# Patient Record
(demographics unavailable — no encounter records)

---

## 2024-11-05 NOTE — HISTORY OF PRESENT ILLNESS
[de-identified] :  CATHERINE RANGEL is a 32 year old patient seen initially for throat pain of 18 months duration. He initially thought it would go away but it has worsened recently. 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 Unable to hit high notes and feels like muscles are constricting. He does not smoke and is not a heavy drinker. He hasn't taken any meds for the throat pain. No unexplained weight loss, hemoptysis, or hematochezia. Mild difficulty swallowing.  He had a thyroid ultrasound done on 1/2024 with a 1.5x 0.5x 0.8cm TIRADS 3 nodule on the right and a 0.4 x 0.3 x 0.3cm TIRADS 4 nodule on the left. No family history of thyroid cancer

## 2024-11-05 NOTE — ASSESSMENT
[FreeTextEntry1] : Vocal cord nodules and LPR.  I also suspect muscle tension dysphonia. Will treat with famotidine twice daily. Emphasize diet and lifestyle modifications. LPR patient education material was provided.  Touched on the possibility of potentially requiring speech therapy. Follow-up in 6 weeks.  The thyroid nodule is incidental.  I explained that is not what causing him his problem.  Would recommend repeat sonogram in 1 year. Of note no family history of thyroid cancer or significant radiation exposure. Has normal thyroid function.

## 2024-11-05 NOTE — PROCEDURE
[de-identified] : PROCEDURE: Flexible laryngoscopy SURGEON: Dr. Castrejon INDICATIONS: He was unable to tolerate a mirror exam. Assess for laryngopharynx pharyngeal reflux. cough. head and neck mass. Verbal Consent obtained. ANESTHESIA: The patient was placed in a sitting position.  Following application of the topical anesthetic and decongestant, exam was performed with a flexible scope.  The scope was passed along the right nasal floor to the nasopharynx.  It was then passed into the region of the middle meatus, middle turbinate, and sphenoethmoid region.  An identical procedure was performed on the left side.  The following findings were noted:   The nasal musoca was healthy appearing and the septum was roughly midline. The middle meatus and sphenoethmoid recesses were clear bilaterally. The nasopharynx   Nasopharynx: no masses, choanae patent, no adenoid tissue   Base of tongue/vallecula: no masses or asymmetry Pharyngeal walls: symmetrical. No masses. Pyriform sinuses: no lesions or pooling of secretions. Epiglottis: normal. No edema or lesions. Aryepiglottic folds: normal. No lesions. Vocal cords: clear and mobile. No lesions. Airway patent. Arytenoids: no edema or erythema. Interarytenoid area: no edema, erythema or lesion.   Tolerated the procedure well.   Bilateral vocal cord nodules. Bilateral mild to moderate LPR.

## 2024-12-17 NOTE — HISTORY OF PRESENT ILLNESS
[de-identified] : Follow up visit He had been taking Famotidine twice daily and made some lifestyle modifications.  He feels about 15-20% better. Still reports slight pain and tension when speaking.

## 2024-12-17 NOTE — PROCEDURE
[de-identified] : PROCEDURE: Flexible laryngoscopy SURGEON: Dr. Castrejon INDICATIONS: He was unable to tolerate a mirror exam. Assess for laryngopharynx pharyngeal reflux. cough. head and neck mass. Verbal Consent obtained. ANESTHESIA: The patient was placed in a sitting position.  Following application of the topical anesthetic and decongestant, exam was performed with a flexible scope.  The scope was passed along the right nasal floor to the nasopharynx.  It was then passed into the region of the middle meatus, middle turbinate, and sphenoethmoid region.  An identical procedure was performed on the left side.  The following findings were noted:   The nasal musoca was healthy appearing and the septum was roughly midline. The middle meatus and sphenoethmoid recesses were clear bilaterally. The nasopharynx   Nasopharynx: no masses, choanae patent, no adenoid tissue   Base of tongue/vallecula: no masses or asymmetry Pharyngeal walls: symmetrical. No masses. Pyriform sinuses: no lesions or pooling of secretions. Epiglottis: normal. No edema or lesions. Aryepiglottic folds: normal. No lesions. Vocal cords: clear and mobile. No lesions. Airway patent. Arytenoids: no edema or erythema. Interarytenoid area: no edema, erythema or lesion.   Tolerated the procedure well.   Bilateral vocal cord nodules

## 2024-12-17 NOTE — ASSESSMENT
[FreeTextEntry1] : He has pain and hoarseness.  He reports feeling 15 to 20% improved. Will recommend omeprazole in the morning and famotidine before bed. Will also rescue him with a low burst of prednisone. Will also suggest he have speech therapy for his vocal cord nodules. Follow-up in 6 to 8 weeks.

## 2025-02-10 NOTE — ASSESSMENT
[FreeTextEntry1] : Clinically doing much better. Will renew his prescriptions. Emphasized that I like him to take the PPI before breakfast and then the fold noted in before bed. He has an appointment for speech therapy in approximately 2 months for vocal cord nodule.  He will see me in follow-up in 3 months.

## 2025-02-10 NOTE — PROCEDURE
[de-identified] : PROCEDURE: Flexible laryngoscopy SURGEON: Dr. Castrejon INDICATIONS: He was unable to tolerate a mirror exam. Assess for laryngopharynx pharyngeal reflux. cough. head and neck mass. Verbal Consent obtained. ANESTHESIA: The patient was placed in a sitting position.  Following application of the topical anesthetic and decongestant, exam was performed with a flexible scope.  The scope was passed along the right nasal floor to the nasopharynx.  It was then passed into the region of the middle meatus, middle turbinate, and sphenoethmoid region.  An identical procedure was performed on the left side.  The following findings were noted:   The nasal musoca was healthy appearing and the septum was roughly midline. The middle meatus and sphenoethmoid recesses were clear bilaterally. The nasopharynx   Nasopharynx: no masses, choanae patent, no adenoid tissue   Base of tongue/vallecula: no masses or asymmetry Pharyngeal walls: symmetrical. No masses. Pyriform sinuses: no lesions or pooling of secretions. Epiglottis: normal. No edema or lesions. Aryepiglottic folds: normal. No lesions. Vocal cords: clear and mobile. No lesions. Airway patent. Arytenoids: no edema or erythema. Interarytenoid area: no edema, erythema or lesion.   Tolerated the procedure well.  Vocal cord nodule still present larynx looks much improved

## 2025-02-10 NOTE — HISTORY OF PRESENT ILLNESS
[de-identified] : 2/10/25: 33 y/o M with vocal cord nodules is taking Omeprazole and Famotidine for reflux.  Taking omeprazole in the morning as well as famotidine and trying to take famotidine in the evening.  Overall reports feeling 35% improved.  Voice is improving.

## 2025-05-12 NOTE — ASSESSMENT
[FreeTextEntry1] : Continue current regimen. Patient education material for LPR was provided. Consider introducing alginate. Continue working with speech therapy. Follow-up in 3 months.

## 2025-05-12 NOTE — PROCEDURE
[de-identified] : PROCEDURE: Flexible laryngoscopy SURGEON: Dr. Castrejon INDICATIONS: He was unable to tolerate a mirror exam. Assess for laryngopharynx pharyngeal reflux. cough. head and neck mass. Verbal Consent obtained. ANESTHESIA: The patient was placed in a sitting position.  Following application of the topical anesthetic and decongestant, exam was performed with a flexible scope.  The scope was passed along the right nasal floor to the nasopharynx.  It was then passed into the region of the middle meatus, middle turbinate, and sphenoethmoid region.  An identical procedure was performed on the left side.  The following findings were noted:   The nasal musoca was healthy appearing and the septum was roughly midline. The middle meatus and sphenoethmoid recesses were clear bilaterally. The nasopharynx   Nasopharynx: no masses, choanae patent, no adenoid tissue   Base of tongue/vallecula: no masses or asymmetry Pharyngeal walls: symmetrical. No masses. Pyriform sinuses: no lesions or pooling of secretions. Epiglottis: normal. No edema or lesions. Aryepiglottic folds: normal. No lesions. Vocal cords: clear and mobile. No lesions. Airway patent. Arytenoids: no edema or erythema. Interarytenoid area: no edema, erythema or lesion.   Tolerated the procedure well.  Bilateral mucus on the striking zone of his vocal cords.

## 2025-05-12 NOTE — HISTORY OF PRESENT ILLNESS
[de-identified] : Working appropriately with speech therapy. Stroboscopy revealed no clear evidence of nodules but rather mucous on his vocal cords. Also evidence of muscle tension dysphonia. He is trying to keep up with recommendations from speech therapy. Take omeprazole in the morning and famotidine before bed.